# Patient Record
Sex: FEMALE | Race: WHITE | Employment: FULL TIME | URBAN - NONMETROPOLITAN AREA
[De-identification: names, ages, dates, MRNs, and addresses within clinical notes are randomized per-mention and may not be internally consistent; named-entity substitution may affect disease eponyms.]

---

## 2017-05-09 ENCOUNTER — HOSPITAL ENCOUNTER (EMERGENCY)
Age: 42
Discharge: HOME | End: 2017-05-09
Attending: FAMILY MEDICINE
Payer: MEDICAID

## 2017-05-09 VITALS
TEMPERATURE: 98.6 F | SYSTOLIC BLOOD PRESSURE: 132 MMHG | RESPIRATION RATE: 16 BRPM | OXYGEN SATURATION: 98 % | HEART RATE: 80 BPM | DIASTOLIC BLOOD PRESSURE: 78 MMHG

## 2017-05-09 DIAGNOSIS — S01.81XA FACIAL LACERATION, INITIAL ENCOUNTER: Primary | ICD-10-CM

## 2017-05-09 PROCEDURE — 12011 RPR F/E/E/N/L/M 2.5 CM/<: CPT

## 2017-05-09 PROCEDURE — 99282 EMERGENCY DEPT VISIT SF MDM: CPT

## 2017-05-09 RX ORDER — LIDOCAINE HYDROCHLORIDE 10 MG/ML
INJECTION, SOLUTION INFILTRATION; PERINEURAL
Status: DISCONTINUED
Start: 2017-05-09 | End: 2017-05-09 | Stop reason: HOSPADM

## 2017-05-09 RX ORDER — BACLOFEN 10 MG/1
10 TABLET ORAL SEE ADMIN INSTRUCTIONS
COMMUNITY
End: 2017-05-09

## 2017-05-09 RX ORDER — LIDOCAINE HYDROCHLORIDE 10 MG/ML
5 INJECTION, SOLUTION EPIDURAL; INFILTRATION; INTRACAUDAL; PERINEURAL ONCE
Status: DISCONTINUED | OUTPATIENT
Start: 2017-05-09 | End: 2017-05-09 | Stop reason: HOSPADM

## 2017-05-09 RX ORDER — METHOCARBAMOL 500 MG/1
500 TABLET, FILM COATED ORAL 3 TIMES DAILY
Qty: 30 TABLET | Refills: 0 | Status: SHIPPED | OUTPATIENT
Start: 2017-05-09 | End: 2017-05-19

## 2017-05-09 RX ORDER — PREDNISONE 50 MG/1
50 TABLET ORAL DAILY
COMMUNITY
End: 2017-07-28 | Stop reason: ALTCHOICE

## 2017-05-09 ASSESSMENT — PAIN DESCRIPTION - PAIN TYPE: TYPE: ACUTE PAIN

## 2017-05-09 ASSESSMENT — PAIN DESCRIPTION - DESCRIPTORS: DESCRIPTORS: CONSTANT

## 2017-05-09 ASSESSMENT — PAIN SCALES - GENERAL: PAINLEVEL_OUTOF10: 3

## 2017-07-28 ENCOUNTER — HOSPITAL ENCOUNTER (EMERGENCY)
Age: 42
Discharge: HOME | End: 2017-07-28
Attending: FAMILY MEDICINE
Payer: MEDICAID

## 2017-07-28 ENCOUNTER — APPOINTMENT (OUTPATIENT)
Dept: GENERAL RADIOLOGY | Age: 42
End: 2017-07-28
Payer: MEDICAID

## 2017-07-28 VITALS
TEMPERATURE: 98.6 F | RESPIRATION RATE: 14 BRPM | DIASTOLIC BLOOD PRESSURE: 78 MMHG | HEART RATE: 80 BPM | SYSTOLIC BLOOD PRESSURE: 113 MMHG | OXYGEN SATURATION: 100 %

## 2017-07-28 DIAGNOSIS — M77.01 MEDIAL EPICONDYLITIS OF ELBOW, RIGHT: Primary | ICD-10-CM

## 2017-07-28 PROCEDURE — 99283 EMERGENCY DEPT VISIT LOW MDM: CPT

## 2017-07-28 PROCEDURE — 73080 X-RAY EXAM OF ELBOW: CPT

## 2017-07-28 RX ORDER — NAPROXEN 500 MG/1
500 TABLET ORAL 2 TIMES DAILY
Qty: 60 TABLET | Refills: 0 | Status: SHIPPED | OUTPATIENT
Start: 2017-07-28 | End: 2017-11-23 | Stop reason: ALTCHOICE

## 2017-07-28 ASSESSMENT — PAIN DESCRIPTION - PAIN TYPE: TYPE: ACUTE PAIN

## 2017-07-28 ASSESSMENT — PAIN DESCRIPTION - LOCATION: LOCATION: ELBOW

## 2017-07-28 ASSESSMENT — PAIN DESCRIPTION - DESCRIPTORS: DESCRIPTORS: CONSTANT

## 2017-07-28 ASSESSMENT — PAIN DESCRIPTION - ORIENTATION: ORIENTATION: RIGHT

## 2017-07-28 ASSESSMENT — PAIN SCALES - GENERAL
PAINLEVEL_OUTOF10: 5
PAINLEVEL_OUTOF10: 4

## 2017-07-28 ASSESSMENT — PAIN DESCRIPTION - FREQUENCY: FREQUENCY: CONTINUOUS

## 2017-07-28 ASSESSMENT — PAIN DESCRIPTION - PROGRESSION: CLINICAL_PROGRESSION: GRADUALLY WORSENING

## 2017-09-13 ENCOUNTER — HOSPITAL ENCOUNTER (EMERGENCY)
Age: 42
Discharge: HOME | End: 2017-09-13
Attending: EMERGENCY MEDICINE
Payer: MEDICAID

## 2017-09-13 VITALS
RESPIRATION RATE: 16 BRPM | TEMPERATURE: 98.1 F | SYSTOLIC BLOOD PRESSURE: 144 MMHG | DIASTOLIC BLOOD PRESSURE: 89 MMHG | HEART RATE: 81 BPM | OXYGEN SATURATION: 99 %

## 2017-09-13 DIAGNOSIS — M54.30 ACUTE SCIATICA: Primary | ICD-10-CM

## 2017-09-13 PROCEDURE — 99282 EMERGENCY DEPT VISIT SF MDM: CPT

## 2017-09-13 PROCEDURE — 96372 THER/PROPH/DIAG INJ SC/IM: CPT

## 2017-09-13 PROCEDURE — 6370000000 HC RX 637 (ALT 250 FOR IP): Performed by: EMERGENCY MEDICINE

## 2017-09-13 PROCEDURE — 6360000002 HC RX W HCPCS: Performed by: EMERGENCY MEDICINE

## 2017-09-13 RX ORDER — LORAZEPAM 0.5 MG/1
1 TABLET ORAL ONCE
Status: COMPLETED | OUTPATIENT
Start: 2017-09-13 | End: 2017-09-13

## 2017-09-13 RX ORDER — BACLOFEN 10 MG/1
10 TABLET ORAL 3 TIMES DAILY PRN
COMMUNITY
End: 2018-08-03 | Stop reason: SDUPTHER

## 2017-09-13 RX ORDER — ONDANSETRON 4 MG/1
4 TABLET, ORALLY DISINTEGRATING ORAL ONCE
Status: COMPLETED | OUTPATIENT
Start: 2017-09-13 | End: 2017-09-13

## 2017-09-13 RX ADMIN — ONDANSETRON 4 MG: 4 TABLET, ORALLY DISINTEGRATING ORAL at 20:38

## 2017-09-13 RX ADMIN — LORAZEPAM 1 MG: 0.5 TABLET ORAL at 21:07

## 2017-09-13 RX ADMIN — HYDROMORPHONE HYDROCHLORIDE 1 MG: 1 INJECTION, SOLUTION INTRAMUSCULAR; INTRAVENOUS; SUBCUTANEOUS at 20:38

## 2017-09-13 ASSESSMENT — ENCOUNTER SYMPTOMS
GASTROINTESTINAL NEGATIVE: 1
BACK PAIN: 1
RESPIRATORY NEGATIVE: 1
ALLERGIC/IMMUNOLOGIC NEGATIVE: 1
ABDOMINAL PAIN: 0
BOWEL INCONTINENCE: 0
ABDOMINAL SWELLING: 0
EYES NEGATIVE: 1

## 2017-09-13 ASSESSMENT — PAIN SCALES - GENERAL
PAINLEVEL_OUTOF10: 9
PAINLEVEL_OUTOF10: 9

## 2017-09-13 ASSESSMENT — PAIN DESCRIPTION - ORIENTATION: ORIENTATION: RIGHT

## 2017-09-13 ASSESSMENT — PAIN DESCRIPTION - DESCRIPTORS: DESCRIPTORS: SHOOTING

## 2017-09-13 ASSESSMENT — PAIN DESCRIPTION - FREQUENCY: FREQUENCY: CONTINUOUS

## 2017-09-13 ASSESSMENT — PAIN DESCRIPTION - PROGRESSION: CLINICAL_PROGRESSION: NOT CHANGED

## 2017-09-13 ASSESSMENT — PAIN DESCRIPTION - LOCATION: LOCATION: BACK

## 2017-09-13 ASSESSMENT — PAIN DESCRIPTION - PAIN TYPE: TYPE: ACUTE PAIN

## 2017-09-13 ASSESSMENT — PAIN DESCRIPTION - ONSET: ONSET: SUDDEN

## 2017-11-23 ENCOUNTER — HOSPITAL ENCOUNTER (EMERGENCY)
Age: 42
Discharge: HOME | End: 2017-11-23
Attending: FAMILY MEDICINE
Payer: COMMERCIAL

## 2017-11-23 ENCOUNTER — APPOINTMENT (OUTPATIENT)
Dept: GENERAL RADIOLOGY | Age: 42
End: 2017-11-23
Payer: COMMERCIAL

## 2017-11-23 VITALS
SYSTOLIC BLOOD PRESSURE: 100 MMHG | RESPIRATION RATE: 16 BRPM | OXYGEN SATURATION: 100 % | DIASTOLIC BLOOD PRESSURE: 76 MMHG | TEMPERATURE: 98.7 F | HEART RATE: 86 BPM
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
## 2017-11-23 DIAGNOSIS — M54.32 SCIATICA OF LEFT SIDE: ICD-10-CM

## 2017-11-23 DIAGNOSIS — S70.02XA CONTUSION OF LEFT HIP, INITIAL ENCOUNTER: ICD-10-CM

## 2017-11-23 PROCEDURE — 99284 EMERGENCY DEPT VISIT MOD MDM: CPT

## 2017-11-23 PROCEDURE — 73502 X-RAY EXAM HIP UNI 2-3 VIEWS: CPT

## 2017-11-23 PROCEDURE — 96372 THER/PROPH/DIAG INJ SC/IM: CPT

## 2017-11-23 PROCEDURE — 6360000002 HC RX W HCPCS: Performed by: FAMILY MEDICINE

## 2017-11-23 RX ORDER — KETOROLAC TROMETHAMINE 30 MG/ML
30 INJECTION, SOLUTION INTRAMUSCULAR; INTRAVENOUS ONCE
Status: COMPLETED | OUTPATIENT
Start: 2017-11-23 | End: 2017-11-23

## 2017-11-23 RX ORDER — NAPROXEN 500 MG/1
500 TABLET ORAL 2 TIMES DAILY
Qty: 60 TABLET | Refills: 0 | Status: SHIPPED | OUTPATIENT
Start: 2017-11-23 | End: 2018-07-12 | Stop reason: ALTCHOICE

## 2017-11-23 RX ORDER — TRAMADOL HYDROCHLORIDE 50 MG/1
50 TABLET ORAL 3 TIMES DAILY PRN
COMMUNITY
End: 2018-08-03 | Stop reason: SDUPTHER

## 2017-11-23 RX ADMIN — KETOROLAC TROMETHAMINE 30 MG: 30 INJECTION, SOLUTION INTRAMUSCULAR at 17:49

## 2017-11-23 ASSESSMENT — PAIN DESCRIPTION - LOCATION: LOCATION: HEAD;HIP

## 2017-11-23 ASSESSMENT — PAIN SCALES - GENERAL
PAINLEVEL_OUTOF10: 6
PAINLEVEL_OUTOF10: 7
PAINLEVEL_OUTOF10: 8

## 2017-11-23 ASSESSMENT — PAIN DESCRIPTION - DESCRIPTORS: DESCRIPTORS: ACHING

## 2017-11-23 ASSESSMENT — PAIN DESCRIPTION - PAIN TYPE: TYPE: ACUTE PAIN

## 2017-11-23 ASSESSMENT — PAIN DESCRIPTION - ORIENTATION: ORIENTATION: RIGHT

## 2017-11-23 NOTE — ED NOTES
Pt works for [employer], not located in drug screen file. Pt asked and states was not told she needed a drug screen.       Sara Adams RN  11/23/17 [id number]

## 2017-11-23 NOTE — ED PROVIDER NOTES
photophobia or discharge   HENT:  Denies sore throat or ear pain   Respiratory:  Denies cough or shortness of breath   Cardiovascular:  Denies chest pain, palpitations or swelling   GI:  Denies abdominal pain, nausea, vomiting, or diarrhea   Musculoskeletal:  Denies back pain   Skin:  Denies rash   Neurologic:  Denies headache, focal weakness or sensory changes   Endocrine:  Denies polyuria or polydypsia   Lymphatic:  Denies swollen glands   Psychiatric:  Denies depression, suicidal ideation or homicidal ideation   All systems negative except as marked. PHYSICAL EXAM    VITAL SIGNS: /76   Pulse 86   Temp 98.7 °F (37.1 °C) (Oral)   Resp 16   SpO2 100%    Constitutional:  Well developed, Well nourished, No acute distress, Non-toxic appearance. HENT:  Normocephalic, Atraumatic, Bilateral external ears normal, TM's normal, Oropharynx moist, No oral exudates, Nose normal. Neck- Normal range of motion, No tenderness, Supple, No stridor. Eyes:  PERRL, EOMI, Conjunctiva normal, No discharge. Respiratory:  Normal breath sounds, No respiratory distress, No wheezing, No chest tenderness. Cardiovascular:  Normal heart rate, Normal rhythm, No murmurs, No rubs, No gallops appreciated. Che Prows GI:  Bowel sounds normal, Soft, No tenderness, No masses or hepatosplenomegaly, No pulsatile masses. : No CVA tenderness. Musculoskeletal:   No edema,tenderness to palpation left hip and inferior pubic ramus, No cyanosis, No clubbing. Good range of motion in all major joints. No  major deformities noted. Back- No tenderness to percussion of spine. Integument:  Warm, Dry, No erythema, No rash. Lymphatic:  No lymphadenopathy noted. Neurologic:  Alert & oriented x 3, Normal motor function, Normal sensory function, No focal deficits noted. Cranial nerves 2-12 wnl and symmetrical.   Psychiatric:  Affect normal, Judgment normal, Mood normal.     EKG        RADIOLOGY    No results found.       PROCEDURES      Labs  Labs

## 2018-07-12 ENCOUNTER — HOSPITAL ENCOUNTER (EMERGENCY)
Age: 43
Discharge: HOME | End: 2018-07-12
Attending: EMERGENCY MEDICINE
Payer: MEDICAID

## 2018-07-12 VITALS
DIASTOLIC BLOOD PRESSURE: 77 MMHG | TEMPERATURE: 98.7 F | OXYGEN SATURATION: 99 % | HEART RATE: 68 BPM | BODY MASS INDEX: 19.27 KG/M2 | SYSTOLIC BLOOD PRESSURE: 122 MMHG | HEIGHT: 67 IN | WEIGHT: 122.8 LBS | RESPIRATION RATE: 20 BRPM

## 2018-07-12 DIAGNOSIS — T63.444A BEE STING, UNDETERMINED INTENT, INITIAL ENCOUNTER: Primary | ICD-10-CM

## 2018-07-12 PROCEDURE — 96374 THER/PROPH/DIAG INJ IV PUSH: CPT

## 2018-07-12 PROCEDURE — 96375 TX/PRO/DX INJ NEW DRUG ADDON: CPT

## 2018-07-12 PROCEDURE — 99282 EMERGENCY DEPT VISIT SF MDM: CPT

## 2018-07-12 PROCEDURE — 6360000002 HC RX W HCPCS: Performed by: EMERGENCY MEDICINE

## 2018-07-12 RX ORDER — METHYLPREDNISOLONE SODIUM SUCCINATE 40 MG/ML
40 INJECTION, POWDER, LYOPHILIZED, FOR SOLUTION INTRAMUSCULAR; INTRAVENOUS ONCE
Status: COMPLETED | OUTPATIENT
Start: 2018-07-12 | End: 2018-07-12

## 2018-07-12 RX ORDER — EPINEPHRINE 0.3 MG/.3ML
0.3 INJECTION SUBCUTANEOUS
Qty: 1 EACH | Refills: 0 | Status: SHIPPED | OUTPATIENT
Start: 2018-07-12 | End: 2018-07-12

## 2018-07-12 RX ORDER — DIPHENHYDRAMINE HYDROCHLORIDE 50 MG/ML
25 INJECTION INTRAMUSCULAR; INTRAVENOUS ONCE
Status: COMPLETED | OUTPATIENT
Start: 2018-07-12 | End: 2018-07-12

## 2018-07-12 RX ORDER — ETODOLAC 500 MG/1
500 TABLET, FILM COATED ORAL 2 TIMES DAILY
COMMUNITY
Start: 2018-05-07 | End: 2018-08-03 | Stop reason: SDUPTHER

## 2018-07-12 RX ADMIN — METHYLPREDNISOLONE SODIUM SUCCINATE 40 MG: 40 INJECTION, POWDER, FOR SOLUTION INTRAMUSCULAR; INTRAVENOUS at 11:19

## 2018-07-12 RX ADMIN — DIPHENHYDRAMINE HYDROCHLORIDE 25 MG: 50 INJECTION, SOLUTION INTRAMUSCULAR; INTRAVENOUS at 11:18

## 2018-07-12 NOTE — ED PROVIDER NOTES
eMERGENCY dEPARTMENT eNCOUnter        [facility]    Chief Complaint   Patient presents with    Insect Bite     stung to left ankle by bee at 0800, started having itching to bilateral hands and right leg        [facility]    Mimi Royal is a 41 y.o. female who presents to ED from Home. By car. With complaint of bee sting to the left ankle at 8 AM  Onset 8 PM this morning  Intensity of symptoms patient presents with itching. Denies shortness of breath denies hives  Location of symptoms left ankle with mild swelling post bee sting. REVIEW OF SYSTEMS    All systems reviewed and positives are in the HPI      PAST MEDICAL HISTORY    Past Medical History:   Diagnosis Date    Back pain 2003    Degenerative disc disease at L5-S1 level     bulged discs    Fibromyalgia        SURGICAL HISTORY    Past Surgical History:   Procedure Laterality Date    HYSTERECTOMY      TONSILLECTOMY         CURRENT MEDICATIONS    Current Outpatient Rx   Medication Sig Dispense Refill    etodolac (LODINE) 500 MG tablet Take 500 mg by mouth 2 times daily      traMADol (ULTRAM) 50 MG tablet Take 50 mg by mouth 3 times daily as needed for Pain .  baclofen (LIORESAL) 10 MG tablet Take 10 mg by mouth 3 times daily as needed      gabapentin (NEURONTIN) 800 MG tablet Take 800 mg by mouth 3 times daily         ALLERGIES    Allergies   Allergen Reactions    Flexeril [Cyclobenzaprine] Shortness Of Breath       FAMILY HISTORY    History reviewed. No pertinent family history.     SOCIAL HISTORY    Social History     Social History    Marital status: Single     Spouse name: N/A    Number of children: N/A    Years of education: N/A     Social History Main Topics    Smoking status: Current Every Day Smoker     Packs/day: 1.00     Types: Cigarettes    Smokeless tobacco: Never Used    Alcohol use No    Drug use: No    Sexual activity: Not Asked     Other Topics Concern    None     Social History Narrative    None       PHYSICAL EXAM    VITAL SIGNS: /77   Pulse 68   Temp 98.7 °F (37.1 °C) (Oral)   Resp 20   Ht 5' 7\" (1.702 m)   Wt 122 lb 12.8 oz (55.7 kg)   SpO2 99%   BMI 19.23 kg/m²   Constitutional:  Well developed, well nourished, no acute distress, non-toxic appearance   HENT:  Atraumatic, external ears normal, nose normal, oropharynx moist, no pharyngeal exudates. Neck- supple   Respiratory:  No respiratory distress, normal breath sounds   Cardiovascular:  Normal rate, normal rhythm, no murmurs, no gallops, no rubs   GI:  Soft, nondistended, normal bowel sounds, nontender, no organomegaly   Musculoskeletal:  No edema, no tenderness, no deformities. Integument: Left ankle with mild swelling and mild redness no hives. RADIOLOGY/PROCEDURES    No orders to display         Labs  Labs Reviewed - No data to display        Summation      Patient Course:Patient also has systemic produced. No other systemic symptoms. Patient is sent home with a prescription for EpiPen   Patient feels better prior to discharge. Continue Benadryl as needed. Return to ED if worse for his symptoms. The warning signs were discussed. ED Medications administered this visit:    Medications   diphenhydrAMINE (BENADRYL) injection 25 mg (not administered)   methylPREDNISolone sodium (SOLU-MEDROL) injection 40 mg (not administered)       New Prescriptions from this visit:    New Prescriptions    No medications on file       Follow-up:  No follow-up provider specified. Final Impression:   1.  Bee sting, undetermined intent, initial encounter               (Please note that portions of this note were completed with a voice recognition program.  Efforts were made to edit the dictations but occasionally words are mis-transcribed.)          Talya Henning MD  07/12/18 [id number]

## 2018-07-30 PROBLEM — M19.90 OA (OSTEOARTHRITIS): Status: ACTIVE | Noted: 2018-07-30

## 2018-07-30 PROBLEM — E05.90 HYPERTHYROIDISM: Status: ACTIVE | Noted: 2018-07-30

## 2018-08-02 PROBLEM — F17.200 SMOKER: Status: ACTIVE | Noted: 2018-08-02

## 2018-08-03 ENCOUNTER — OFFICE VISIT (OUTPATIENT)
Dept: FAMILY MEDICINE CLINIC | Age: 43
End: 2018-08-03
Payer: MEDICAID

## 2018-08-03 VITALS
WEIGHT: 123 LBS | BODY MASS INDEX: 19.3 KG/M2 | SYSTOLIC BLOOD PRESSURE: 110 MMHG | DIASTOLIC BLOOD PRESSURE: 72 MMHG | RESPIRATION RATE: 18 BRPM | HEART RATE: 72 BPM | HEIGHT: 67 IN

## 2018-08-03 DIAGNOSIS — F17.200 TOBACCO DEPENDENCE: ICD-10-CM

## 2018-08-03 DIAGNOSIS — M47.816 OSTEOARTHRITIS OF LUMBAR SPINE, UNSPECIFIED SPINAL OSTEOARTHRITIS COMPLICATION STATUS: ICD-10-CM

## 2018-08-03 DIAGNOSIS — Z13.220 LIPID SCREENING: ICD-10-CM

## 2018-08-03 DIAGNOSIS — E05.90 HYPERTHYROIDISM: ICD-10-CM

## 2018-08-03 DIAGNOSIS — M51.36 LUMBAR DEGENERATIVE DISC DISEASE: Primary | ICD-10-CM

## 2018-08-03 DIAGNOSIS — Z23 NEED FOR STREPTOCOCCUS PNEUMONIAE VACCINATION: ICD-10-CM

## 2018-08-03 DIAGNOSIS — M79.7 FIBROMYALGIA: ICD-10-CM

## 2018-08-03 PROCEDURE — 90732 PPSV23 VACC 2 YRS+ SUBQ/IM: CPT | Performed by: INTERNAL MEDICINE

## 2018-08-03 PROCEDURE — G8420 CALC BMI NORM PARAMETERS: HCPCS | Performed by: INTERNAL MEDICINE

## 2018-08-03 PROCEDURE — G8427 DOCREV CUR MEDS BY ELIG CLIN: HCPCS | Performed by: INTERNAL MEDICINE

## 2018-08-03 PROCEDURE — 99204 OFFICE O/P NEW MOD 45 MIN: CPT | Performed by: INTERNAL MEDICINE

## 2018-08-03 PROCEDURE — 90471 IMMUNIZATION ADMIN: CPT | Performed by: INTERNAL MEDICINE

## 2018-08-03 PROCEDURE — 4004F PT TOBACCO SCREEN RCVD TLK: CPT | Performed by: INTERNAL MEDICINE

## 2018-08-03 RX ORDER — GABAPENTIN 800 MG/1
800 TABLET ORAL 3 TIMES DAILY
Qty: 120 TABLET | Refills: 2 | Status: SHIPPED | OUTPATIENT
Start: 2018-08-03 | End: 2018-08-14 | Stop reason: SDUPTHER

## 2018-08-03 RX ORDER — BACLOFEN 10 MG/1
10 TABLET ORAL 3 TIMES DAILY PRN
Qty: 90 TABLET | Refills: 2 | Status: SHIPPED | OUTPATIENT
Start: 2018-08-03 | End: 2018-09-02

## 2018-08-03 RX ORDER — VARENICLINE TARTRATE 25 MG
KIT ORAL
Qty: 1 EACH | Refills: 0 | Status: SHIPPED | OUTPATIENT
Start: 2018-08-03 | End: 2018-11-25 | Stop reason: ALTCHOICE

## 2018-08-03 RX ORDER — ETODOLAC 500 MG/1
500 TABLET, FILM COATED ORAL 2 TIMES DAILY
Qty: 60 TABLET | Refills: 3 | Status: SHIPPED | OUTPATIENT
Start: 2018-08-03 | End: 2018-11-20 | Stop reason: SDUPTHER

## 2018-08-03 RX ORDER — TRAMADOL HYDROCHLORIDE 50 MG/1
100 TABLET ORAL 3 TIMES DAILY PRN
Qty: 180 TABLET | Refills: 0 | Status: SHIPPED | OUTPATIENT
Start: 2018-08-03 | End: 2018-08-30 | Stop reason: SDUPTHER

## 2018-08-03 ASSESSMENT — ENCOUNTER SYMPTOMS
COUGH: 0
NAUSEA: 0
ABDOMINAL PAIN: 0
SORE THROAT: 0
SHORTNESS OF BREATH: 0
HEARTBURN: 0
VOMITING: 0
BACK PAIN: 1
DIARRHEA: 0
BLURRED VISION: 0

## 2018-08-03 ASSESSMENT — PATIENT HEALTH QUESTIONNAIRE - PHQ9
2. FEELING DOWN, DEPRESSED OR HOPELESS: 0
SUM OF ALL RESPONSES TO PHQ QUESTIONS 1-9: 0
1. LITTLE INTEREST OR PLEASURE IN DOING THINGS: 0
SUM OF ALL RESPONSES TO PHQ9 QUESTIONS 1 & 2: 0

## 2018-08-03 NOTE — PROGRESS NOTES
HPI Notes    Name: Barbara Magana  : 1975         Chief Complaint:     Chief Complaint   Patient presents with    New Patient     get established    Back Pain     lumbar disc disease       History of Present Illness:        Ford Neil is a new patient. She presents to our office to follow up for fibromyalgia, chronic back pain due to spinal stenosis, tobacco dependence and also history of thyroid problem in the past.    She has a h/o Fibromyalgia, on Gabapentin, helps a lot. she would like to get that he feels    Has a h/o spinal stenosis. Says was seeing Dr. Jeri Boyd in the past, says spinal fusion was offerred, but no good results were promised and she decided not to have surgery. Takes tramadol for pain control and it controls the pain reasonably so that she can function. She works as a [employer]. She used to follow up with pain doctors and unfortunately they moved out one after another. She would like a referral to a pain specialist for tramadol prescription. She reports smoking most of her life, she and would like to try Chantix for tobacco cessation. We discussed potential side effects from Chantix and she is advised to quit taking if she develops any side effects. The patient also states that she has a history of some sort of thyroid problem. Used to be on some medications, does not recall the name          Back Pain   This is a chronic problem. The current episode started more than 1 year ago. The problem occurs constantly. The problem is unchanged. The pain is present in the lumbar spine. The pain is at a severity of 7/10. The pain is worse during the day. The symptoms are aggravated by standing and twisting. Pertinent negatives include no abdominal pain, bladder incontinence, chest pain, dysuria, fever, headaches or weight loss. She has tried NSAIDs and muscle relaxant (Tramadol) for the symptoms. The treatment provided moderate relief.            Past Medical History:     Past Medical History:   Diagnosis Date    Back pain 2003    Degenerative disc disease at L5-S1 level     bulged discs    Fibromyalgia       Reviewed all health maintenance requirements and ordered appropriate tests  Health Maintenance Due   Topic Date Due    TSH testing  1975    HIV screen  03/20/1992    DTaP/Tdap/Td vaccine (1 - Tdap) 03/20/1996    Cervical cancer screen  03/20/1998    Lipid screen  03/20/2017       Past Surgical History:     Past Surgical History:   Procedure Laterality Date    HYSTERECTOMY      TONSILLECTOMY          Medications:       Prior to Admission medications    Medication Sig Start Date End Date Taking? Authorizing Provider   etodolac (LODINE) 500 MG tablet Take 1 tablet by mouth 2 times daily 8/3/18  Yes Gudelia Lyon MD   traMADol (ULTRAM) 50 MG tablet Take 2 tablets by mouth 3 times daily as needed for Pain for up to 30 days. . 8/3/18 9/2/18 Yes Gudelia Lyon MD   baclofen (LIORESAL) 10 MG tablet Take 1 tablet by mouth 3 times daily as needed (back spasm) 8/3/18 9/2/18 Yes Gudelia Lyon MD   gabapentin (NEURONTIN) 800 MG tablet Take 1 tablet by mouth 3 times daily for 30 days. . 8/3/18 9/2/18 Yes Gudelia Lyon MD   varenicline (CHANTIX STARTING MONTH PAK) 0.5 MG X 11 & 1 MG X 42 tablet Take by mouth. 8/3/18  Yes Gudelia Lyon MD   EPINEPHrine (EPIPEN 2-ROBERTO) 0.3 MG/0.3ML SOAJ injection Inject 0.3 mLs into the muscle once as needed (For anaphylactic symptoms) 7/12/18 7/12/18  Annmarie Bauer MD        Allergies:       Bee venom and Flexeril [cyclobenzaprine]    Social History:     Tobacco:    reports that she has been smoking Cigarettes. She has a 10.50 pack-year smoking history. She has never used smokeless tobacco.  Alcohol:      reports that she does not drink alcohol. Drug Use:  reports that she does not use drugs. Family History:     No family history on file.     Review of Systems:         Review of Systems   Constitutional: Negative for chills, fever, malaise/fatigue and weight loss. HENT: Negative for congestion and sore throat. Eyes: Negative for blurred vision. Respiratory: Negative for cough and shortness of breath. Cardiovascular: Negative for chest pain and palpitations. Gastrointestinal: Negative for abdominal pain, diarrhea, heartburn, nausea and vomiting. Genitourinary: Negative for bladder incontinence and dysuria. Musculoskeletal: Positive for back pain and myalgias (Due to fibromyalgia). Skin: Negative for rash. Neurological: Negative for dizziness and headaches. Psychiatric/Behavioral: Negative for depression and suicidal ideas. The patient is not nervous/anxious and does not have insomnia. Physical Exam:     Vitals:  /72 (Site: Left Arm, Position: Sitting, Cuff Size: Medium Adult)   Pulse 72   Resp 18   Ht 5' 7\" (1.702 m)   Wt 123 lb (55.8 kg)   BMI 19.26 kg/m²       Physical Exam   Constitutional: She is oriented to person, place, and time. She appears well-developed and well-nourished. No distress. HENT:   Head: Normocephalic and atraumatic. Right Ear: External ear normal.   Left Ear: External ear normal.   Mouth/Throat: Oropharynx is clear and moist. No oropharyngeal exudate. Eyes: Conjunctivae are normal. Pupils are equal, round, and reactive to light. Right eye exhibits no discharge. Left eye exhibits no discharge. Neck: No thyromegaly present. Cardiovascular: Normal rate, regular rhythm and normal heart sounds. No murmur heard. Pulmonary/Chest: Effort normal and breath sounds normal. She has no wheezes. She has no rales. Abdominal: Soft. Bowel sounds are normal. She exhibits no distension and no mass. There is no tenderness. Musculoskeletal: Normal range of motion. She exhibits no edema, tenderness or deformity. Lymphadenopathy:     She has no cervical adenopathy. Neurological: She is alert and oriented to person, place, and time. No cranial nerve deficit.    Skin: Skin is warm and tablet 90 tablet 2     Sig: Take 1 tablet by mouth 3 times daily as needed (back spasm)    gabapentin (NEURONTIN) 800 MG tablet 120 tablet 2     Sig: Take 1 tablet by mouth 3 times daily for 30 days. Quirino Estradaer varenicline (CHANTIX STARTING MONTH ROBERTO) 0.5 MG X 11 & 1 MG X 42 tablet 1 each 0     Sig: Take by mouth. Return in about 4 weeks (around 8/31/2018) for chantix, tobacco dependence. Orders Placed This Encounter   Medications    etodolac (LODINE) 500 MG tablet     Sig: Take 1 tablet by mouth 2 times daily     Dispense:  60 tablet     Refill:  3    traMADol (ULTRAM) 50 MG tablet     Sig: Take 2 tablets by mouth 3 times daily as needed for Pain for up to 30 days. .     Dispense:  180 tablet     Refill:  0    baclofen (LIORESAL) 10 MG tablet     Sig: Take 1 tablet by mouth 3 times daily as needed (back spasm)     Dispense:  90 tablet     Refill:  2    gabapentin (NEURONTIN) 800 MG tablet     Sig: Take 1 tablet by mouth 3 times daily for 30 days. .     Dispense:  120 tablet     Refill:  2    varenicline (CHANTIX STARTING MONTH PAK) 0.5 MG X 11 & 1 MG X 42 tablet     Sig: Take by mouth. Dispense:  1 each     Refill:  0     Orders Placed This Encounter   Procedures    Pneumococcal polysaccharide vaccine 23-valent greater than or equal to 3yo subcutaneous/IM    TSH with Reflex     Standing Status:   Future     Standing Expiration Date:   8/3/2019    Lipid Panel     Standing Status:   Future     Standing Expiration Date:   8/3/2019     Order Specific Question:   Is Patient Fasting?/# of Hours     Answer:   8 hrs    External Referral To Pain Clinic     Referral Priority:   Routine     Referral Type:   Consult for Advice and Opinion     Referral Reason:   Specialty Services Required     Referred to Provider:   Bruna Fisher MD     Requested Specialty:   Pain Management     Number of Visits Requested:   1         There are no Patient Instructions on file for this visit.     Electronically signed by Lolis Calixto

## 2018-08-06 DIAGNOSIS — M51.36 LUMBAR DEGENERATIVE DISC DISEASE: Primary | ICD-10-CM

## 2018-08-06 DIAGNOSIS — M79.7 FIBROMYALGIA: ICD-10-CM

## 2018-08-14 ENCOUNTER — TELEPHONE (OUTPATIENT)
Dept: FAMILY MEDICINE CLINIC | Age: 43
End: 2018-08-14

## 2018-08-14 RX ORDER — GABAPENTIN 800 MG/1
800 TABLET ORAL 4 TIMES DAILY
Qty: 120 TABLET | Refills: 2 | Status: SHIPPED | OUTPATIENT
Start: 2018-08-14 | End: 2018-09-13

## 2018-08-24 ENCOUNTER — HOSPITAL ENCOUNTER (OUTPATIENT)
Age: 43
Discharge: HOME | End: 2018-08-24
Payer: MEDICAID

## 2018-08-24 ENCOUNTER — TELEPHONE (OUTPATIENT)
Dept: FAMILY MEDICINE CLINIC | Age: 43
End: 2018-08-24

## 2018-08-24 DIAGNOSIS — M51.36 LUMBAR DEGENERATIVE DISC DISEASE: Primary | ICD-10-CM

## 2018-08-24 DIAGNOSIS — Z13.220 LIPID SCREENING: ICD-10-CM

## 2018-08-24 DIAGNOSIS — M54.5 CHRONIC LOW BACK PAIN, UNSPECIFIED BACK PAIN LATERALITY, WITH SCIATICA PRESENCE UNSPECIFIED: ICD-10-CM

## 2018-08-24 DIAGNOSIS — G89.29 CHRONIC LOW BACK PAIN, UNSPECIFIED BACK PAIN LATERALITY, WITH SCIATICA PRESENCE UNSPECIFIED: ICD-10-CM

## 2018-08-24 DIAGNOSIS — E05.90 HYPERTHYROIDISM: ICD-10-CM

## 2018-08-24 PROBLEM — M54.50 CHRONIC LOW BACK PAIN: Status: ACTIVE | Noted: 2018-08-24

## 2018-08-24 LAB
CHOLESTEROL/HDL RATIO: 3.2
CHOLESTEROL: 169 MG/DL
HDLC SERPL-MCNC: 53 MG/DL
LDL CHOLESTEROL: 101 MG/DL (ref 0–130)
PATIENT FASTING?: YES
THYROXINE, FREE: 1.02 NG/DL (ref 0.93–1.7)
TRIGL SERPL-MCNC: 77 MG/DL
TSH SERPL DL<=0.05 MIU/L-ACNC: 0.21 MIU/L (ref 0.3–5)
VLDLC SERPL CALC-MCNC: NORMAL MG/DL (ref 1–30)

## 2018-08-24 PROCEDURE — 80061 LIPID PANEL: CPT

## 2018-08-24 PROCEDURE — 84443 ASSAY THYROID STIM HORMONE: CPT

## 2018-08-24 PROCEDURE — 84439 ASSAY OF FREE THYROXINE: CPT

## 2018-08-24 PROCEDURE — 36415 COLL VENOUS BLD VENIPUNCTURE: CPT

## 2018-08-24 NOTE — TELEPHONE ENCOUNTER
Pt was [facility] to [facility]. Pt was a pt of their affiliate clinic and can't be scheduled with them. The doctor she was seeing has left that practice and would like to be referred to [facility] now.      Health Maintenance   Topic Date Due    TSH testing  1975    HIV screen  03/20/1992    DTaP/Tdap/Td vaccine (1 - Tdap) 03/20/1996    Cervical cancer screen  03/20/1998    Lipid screen  03/20/2017    Flu vaccine (1) 09/01/2018    Pneumococcal med risk  Completed             (applicable per patient's age: Cancer Screenings, Depression Screening, Fall Risk Screening, Immunizations)    No results found for: LABA1C, LABMICR, LDLCHOLESTEROL, LDLCALC, AST, ALT, BUN   (goal A1C is < 7)   (goal LDL is <100) need 30-50% reduction from baseline     BP Readings from Last 3 Encounters:   08/03/18 110/72   07/12/18 122/77   11/23/17 100/76    (goal /80)      All Future Testing planned in [facility]:  Lab Frequency Next Occurrence   TSH with Reflex Once 03/13/2019   Lipid Panel Once 03/13/2019       Next Visit Date:  Future Appointments  Date Time Provider Pacheco Yeung   8/30/2018 2:15 PM Mary Ulrich, Marifer Treadwell            Patient Active Problem List:     Hyperthyroidism     Lumbar degenerative disc disease     OA (osteoarthritis)     Smoker     Fibromyalgia

## 2018-08-30 ENCOUNTER — OFFICE VISIT (OUTPATIENT)
Dept: FAMILY MEDICINE CLINIC | Age: 43
End: 2018-08-30
Payer: MEDICAID

## 2018-08-30 VITALS
SYSTOLIC BLOOD PRESSURE: 128 MMHG | WEIGHT: 121 LBS | HEART RATE: 66 BPM | BODY MASS INDEX: 18.99 KG/M2 | HEIGHT: 67 IN | DIASTOLIC BLOOD PRESSURE: 86 MMHG

## 2018-08-30 DIAGNOSIS — F17.209 NICOTINE DEPENDENCE WITH NICOTINE-INDUCED DISORDER, UNSPECIFIED NICOTINE PRODUCT TYPE: ICD-10-CM

## 2018-08-30 DIAGNOSIS — M47.816 OSTEOARTHRITIS OF LUMBAR SPINE, UNSPECIFIED SPINAL OSTEOARTHRITIS COMPLICATION STATUS: ICD-10-CM

## 2018-08-30 DIAGNOSIS — M51.36 LUMBAR DEGENERATIVE DISC DISEASE: Primary | ICD-10-CM

## 2018-08-30 PROCEDURE — 99213 OFFICE O/P EST LOW 20 MIN: CPT | Performed by: INTERNAL MEDICINE

## 2018-08-30 PROCEDURE — 4004F PT TOBACCO SCREEN RCVD TLK: CPT | Performed by: INTERNAL MEDICINE

## 2018-08-30 PROCEDURE — G8427 DOCREV CUR MEDS BY ELIG CLIN: HCPCS | Performed by: INTERNAL MEDICINE

## 2018-08-30 PROCEDURE — G8420 CALC BMI NORM PARAMETERS: HCPCS | Performed by: INTERNAL MEDICINE

## 2018-08-30 RX ORDER — VARENICLINE TARTRATE 1 MG/1
1 TABLET, FILM COATED ORAL 2 TIMES DAILY
Qty: 60 TABLET | Refills: 1 | Status: SHIPPED | OUTPATIENT
Start: 2018-08-30 | End: 2018-11-25 | Stop reason: ALTCHOICE

## 2018-08-30 RX ORDER — TRAMADOL HYDROCHLORIDE 50 MG/1
100 TABLET ORAL 3 TIMES DAILY PRN
Qty: 180 TABLET | Refills: 0 | Status: SHIPPED | OUTPATIENT
Start: 2018-08-30 | End: 2018-09-29

## 2018-08-30 ASSESSMENT — ENCOUNTER SYMPTOMS
COUGH: 0
ABDOMINAL PAIN: 0
BACK PAIN: 1
BLURRED VISION: 0
SORE THROAT: 0
NAUSEA: 0
SHORTNESS OF BREATH: 0
HEARTBURN: 0

## 2018-08-30 NOTE — PROGRESS NOTES
gabapentin (NEURONTIN) 800 MG tablet Take 1 tablet by mouth 4 times daily for 30 days. . 8/14/18 9/13/18 Yes Sanam Mtz MD   etodolac (LODINE) 500 MG tablet Take 1 tablet by mouth 2 times daily 8/3/18  Yes Sanam Mtz MD   baclofen (LIORESAL) 10 MG tablet Take 1 tablet by mouth 3 times daily as needed (back spasm) 8/3/18 9/2/18 Yes Sanam Mtz MD   varenicline (CHANTIX STARTING MONTH PAK) 0.5 MG X 11 & 1 MG X 42 tablet Take by mouth. 8/3/18  Yes Sanam Mtz MD   EPINEPHrine (EPIPEN 2-ROBERTO) 0.3 MG/0.3ML SOAJ injection Inject 0.3 mLs into the muscle once as needed (For anaphylactic symptoms) 7/12/18 7/12/18  Sandie Luo MD        Allergies:       Bee venom and Flexeril [cyclobenzaprine]    Social History:     Tobacco:    reports that she has been smoking Cigarettes. She has a 10.50 pack-year smoking history. She has never used smokeless tobacco.  Alcohol:      reports that she does not drink alcohol. Drug Use:  reports that she does not use drugs. Family History:     No family history on file. Review of Systems:         Review of Systems   Constitutional: Negative for chills, fever, malaise/fatigue and weight loss. HENT: Negative for congestion and sore throat. Eyes: Negative for blurred vision. Respiratory: Negative for cough and shortness of breath. Cardiovascular: Negative for chest pain and palpitations. Gastrointestinal: Negative for abdominal pain, heartburn and nausea. Genitourinary: Negative for dysuria. Musculoskeletal: Positive for back pain (chronic). Skin: Negative for rash. Neurological: Negative for dizziness and headaches. Psychiatric/Behavioral: Negative for depression, hallucinations, substance abuse and suicidal ideas. The patient is not nervous/anxious and does not have insomnia.           Physical Exam:     Vitals:  /86 (Site: Left Arm, Position: Sitting, Cuff Size: Large Adult)   Pulse 66   Ht 5' 7\" (1.702 m)   Wt 121 lb (54.9 kg) BMI 18.95 kg/m²       Physical Exam   Constitutional: She is oriented to person, place, and time. She appears well-developed and well-nourished. No distress. HENT:   Head: Normocephalic and atraumatic. Neck: No thyromegaly present. Cardiovascular: Normal rate, regular rhythm and normal heart sounds. No murmur heard. Pulmonary/Chest: Effort normal and breath sounds normal. She has no wheezes. She has no rales. Abdominal: Soft. Bowel sounds are normal. She exhibits no distension and no mass. There is no tenderness. Musculoskeletal: Normal range of motion. She exhibits tenderness (lumbar spine tender to palpation). She exhibits no edema. Lymphadenopathy:     She has no cervical adenopathy. Neurological: She is alert and oriented to person, place, and time. Skin: Skin is warm and dry. No rash noted. Psychiatric: She has a normal mood and affect. Her behavior is normal. Judgment normal.   Vitals reviewed. Data:     No results found for: NA, K, CL, CO2, BUN, CREATININE, GLUCOSE, PROT, LABALBU, BILITOT, ALKPHOS, AST, ALT  No results found for: WBC, RBC, HGB, HCT, MCV, MCH, MCHC, RDW, PLT, MPV  Lab Results   Component Value Date    TSH 0.21 08/24/2018     Lab Results   Component Value Date    CHOL 169 08/24/2018    HDL 53 08/24/2018          Assessment & Plan        Diagnosis Orders   1. Lumbar degenerative disc disease   Will refill tramadol until she is able to see her pain doctor in [place] traMADol (ULTRAM) 50 MG tablet   2. Nicotine dependence with nicotine-induced disorder, unspecified nicotine product type   She would like to continue Chantix, ordered refill. varenicline (CHANTIX CONTINUING MONTH ROBERTO) 1 MG tablet   3.  Osteoarthritis of lumbar spine, unspecified spinal osteoarthritis complication status  traMADol (ULTRAM) 50 MG tablet                   Completed Refills   Requested Prescriptions     Signed Prescriptions Disp Refills    varenicline (CHANTIX CONTINUING MONTH ROBERTO) 1 MG tablet 60 tablet 1     Sig: Take 1 tablet by mouth 2 times daily    traMADol (ULTRAM) 50 MG tablet 180 tablet 0     Sig: Take 2 tablets by mouth 3 times daily as needed for Pain for up to 30 days. .     Return in about 6 months (around 2/28/2019) for fibromyalgia. Orders Placed This Encounter   Medications    varenicline (CHANTIX CONTINUING MONTH PAK) 1 MG tablet     Sig: Take 1 tablet by mouth 2 times daily     Dispense:  60 tablet     Refill:  1    traMADol (ULTRAM) 50 MG tablet     Sig: Take 2 tablets by mouth 3 times daily as needed for Pain for up to 30 days. .     Dispense:  180 tablet     Refill:  0     No orders of the defined types were placed in this encounter. Patient Instructions     SURVEY:    You may be receiving a survey from [employer] regarding your visit today. Please complete the survey to enable us to provide the highest quality of care to you and your family. If you cannot score us a very good on any question, please call the office to discuss how we could of made your experience a very good one. Thank you. Electronically signed by Bethanne Babinski, MD on 8/30/2018 at 2:29 PM           Completed Refills   Requested Prescriptions     Signed Prescriptions Disp Refills    varenicline (CHANTIX CONTINUING MONTH PAK) 1 MG tablet 60 tablet 1     Sig: Take 1 tablet by mouth 2 times daily    traMADol (ULTRAM) 50 MG tablet 180 tablet 0     Sig: Take 2 tablets by mouth 3 times daily as needed for Pain for up to 30 days. Petty Dutta

## 2018-10-08 ENCOUNTER — TELEPHONE (OUTPATIENT)
Dept: FAMILY MEDICINE CLINIC | Age: 43
End: 2018-10-08

## 2018-10-30 RX ORDER — BACLOFEN 10 MG/1
10 TABLET ORAL 3 TIMES DAILY
Qty: 90 TABLET | Refills: 3 | Status: SHIPPED | OUTPATIENT
Start: 2018-10-30 | End: 2019-06-20 | Stop reason: SDUPTHER

## 2018-11-05 RX ORDER — GABAPENTIN 800 MG/1
TABLET ORAL
Refills: 0 | COMMUNITY
Start: 2018-09-29 | End: 2018-11-24 | Stop reason: SDUPTHER

## 2018-11-20 ENCOUNTER — TELEPHONE (OUTPATIENT)
Dept: FAMILY MEDICINE CLINIC | Age: 43
End: 2018-11-20

## 2018-11-20 DIAGNOSIS — M47.816 OSTEOARTHRITIS OF LUMBAR SPINE, UNSPECIFIED SPINAL OSTEOARTHRITIS COMPLICATION STATUS: ICD-10-CM

## 2018-11-20 DIAGNOSIS — M51.36 LUMBAR DEGENERATIVE DISC DISEASE: ICD-10-CM

## 2018-11-20 RX ORDER — ETODOLAC 500 MG/1
500 TABLET, FILM COATED ORAL 2 TIMES DAILY
Qty: 60 TABLET | Refills: 3 | Status: SHIPPED | OUTPATIENT
Start: 2018-11-20

## 2018-11-25 ENCOUNTER — HOSPITAL ENCOUNTER (EMERGENCY)
Age: 43
Discharge: HOME | End: 2018-11-25
Attending: EMERGENCY MEDICINE
Payer: MEDICAID

## 2018-11-25 VITALS
HEIGHT: 67 IN | WEIGHT: 119 LBS | SYSTOLIC BLOOD PRESSURE: 130 MMHG | TEMPERATURE: 98 F | RESPIRATION RATE: 20 BRPM | BODY MASS INDEX: 18.68 KG/M2 | HEART RATE: 99 BPM | DIASTOLIC BLOOD PRESSURE: 92 MMHG | OXYGEN SATURATION: 100 %

## 2018-11-25 DIAGNOSIS — S39.012A BACK STRAIN, INITIAL ENCOUNTER: Primary | ICD-10-CM

## 2018-11-25 DIAGNOSIS — S16.1XXA STRAIN OF NECK MUSCLE, INITIAL ENCOUNTER: ICD-10-CM

## 2018-11-25 PROCEDURE — 6370000000 HC RX 637 (ALT 250 FOR IP): Performed by: EMERGENCY MEDICINE

## 2018-11-25 PROCEDURE — 99283 EMERGENCY DEPT VISIT LOW MDM: CPT

## 2018-11-25 RX ORDER — NAPROXEN 375 MG/1
375 TABLET ORAL 2 TIMES DAILY
Qty: 30 TABLET | Refills: 0 | Status: SHIPPED | OUTPATIENT
Start: 2018-11-25

## 2018-11-25 RX ORDER — IBUPROFEN 200 MG
600 TABLET ORAL ONCE
Status: COMPLETED | OUTPATIENT
Start: 2018-11-25 | End: 2018-11-25

## 2018-11-25 RX ORDER — AMOXICILLIN AND CLAVULANATE POTASSIUM 500; 125 MG/1; MG/1
1 TABLET, FILM COATED ORAL 3 TIMES DAILY
Qty: 30 TABLET | Refills: 0 | Status: SHIPPED | OUTPATIENT
Start: 2018-11-25 | End: 2018-11-25 | Stop reason: CLARIF

## 2018-11-25 RX ORDER — METAXALONE 800 MG/1
800 TABLET ORAL 3 TIMES DAILY
Qty: 30 TABLET | Refills: 0 | Status: SHIPPED | OUTPATIENT
Start: 2018-11-25 | End: 2018-12-05

## 2018-11-25 RX ADMIN — IBUPROFEN 600 MG: 200 TABLET, FILM COATED ORAL at 13:27

## 2018-11-25 ASSESSMENT — ENCOUNTER SYMPTOMS
COLOR CHANGE: 0
EYE PAIN: 0
BACK PAIN: 1
DIARRHEA: 0
SORE THROAT: 0
NAUSEA: 0
TROUBLE SWALLOWING: 0
ABDOMINAL PAIN: 0
EYE REDNESS: 0
COUGH: 0
SHORTNESS OF BREATH: 0
VOMITING: 0

## 2018-11-25 ASSESSMENT — PAIN DESCRIPTION - ORIENTATION: ORIENTATION: RIGHT;LEFT;POSTERIOR

## 2018-11-25 ASSESSMENT — PAIN SCALES - GENERAL
PAINLEVEL_OUTOF10: 7
PAINLEVEL_OUTOF10: 8
PAINLEVEL_OUTOF10: 7

## 2018-11-25 ASSESSMENT — PAIN DESCRIPTION - ONSET: ONSET: SUDDEN

## 2018-11-25 ASSESSMENT — PAIN DESCRIPTION - PAIN TYPE: TYPE: ACUTE PAIN

## 2018-11-25 ASSESSMENT — PAIN DESCRIPTION - DESCRIPTORS: DESCRIPTORS: ACHING

## 2018-11-25 ASSESSMENT — PAIN DESCRIPTION - FREQUENCY: FREQUENCY: CONTINUOUS

## 2018-11-25 ASSESSMENT — PAIN DESCRIPTION - PROGRESSION: CLINICAL_PROGRESSION: GRADUALLY WORSENING

## 2018-11-25 ASSESSMENT — PAIN DESCRIPTION - LOCATION: LOCATION: BACK

## 2018-11-25 NOTE — ED PROVIDER NOTES
S1 intact. DIAGNOSTIC RESULTS       EMERGENCY DEPARTMENT COURSE and DIFFERENTIAL DIAGNOSIS/MDM:   Vitals:    Vitals:    11/25/18 1318   BP: (!) 130/92   Pulse: 99   Resp: 20   TempSrc: Oral   SpO2: 100%   Weight: 119 lb (54 kg)   Height: 5' 7\" (1.702 m)                 REASSESSMENT      Here in the ED patient be started on anti-inflammatories and discharged to home. PROCEDURES:  Unless otherwise noted below, none     Procedures    FINAL IMPRESSION      1. Back strain, initial encounter    2. Strain of neck muscle, initial encounter          DISPOSITION/PLAN   DISPOSITION Decision To Discharge 11/25/2018 01:31:50 PM      PATIENT REFERRED TO:  No follow-up provider specified.     DISCHARGE MEDICATIONS:  New Prescriptions    AMOXICILLIN-CLAVULANATE (AUGMENTIN) 500-125 MG PER TABLET    Take 1 tablet by mouth 3 times daily for 10 days    NAPROXEN (NAPROSYN) 375 MG TABLET    Take 1 tablet by mouth 2 times daily          (Please note that portions ofthis note were completed with a voice recognition program.  Efforts were made to edit the dictations but occasionally words are mis-transcribed.)    Hyacinth Burns MD(electronically signed)  Attending Emergency Physician            Hyacinth Burns MD  11/25/18 [id number]

## 2018-11-25 NOTE — LETTER
[facility]  [address] [address]  Phone: [phone]               November 25, 2018    Patient: Suni Amin   YOB: 1975   Date of Visit: 11/25/2018       To Whom It May Concern:    Manish Batista was seen and treated in our emergency department on 11/25/2018. She may return to work on 11/27/2018.       Sincerely,       Margie Ellis RN         Signature:__________________________________

## 2018-11-26 RX ORDER — GABAPENTIN 800 MG/1
TABLET ORAL
Qty: 120 TABLET | Refills: 1 | Status: SHIPPED | OUTPATIENT
Start: 2018-11-26 | End: 2019-01-18 | Stop reason: SDUPTHER

## 2018-11-27 ENCOUNTER — TELEPHONE (OUTPATIENT)
Dept: FAMILY MEDICINE CLINIC | Age: 43
End: 2018-11-27

## 2018-11-27 NOTE — TELEPHONE ENCOUNTER
[facility] Trans[facility] Initial Follow Up Call    Outreach made within 2 business days of discharge: Yes    Patient: Sveta Borges Patient : 1975   MRN: [id number]  Reason for Admission: There are no discharge diagnoses documented for the most recent discharge. Discharge Date: 18       Spoke with: Patient    Discharge department/facility: [facility] Patient Contact:  Was patient able to fill all prescriptions: No: not needed pt already takes  Was patient instructed to bring all medications to the follow-up visit: Yes  Is patient taking all medications as directed in the discharge summary? Yes  Does patient understand their discharge instructions: Yes  Does patient have questions or concerns that need addressed prior to 7-14 day follow up office visit: no    Scheduled appointment with PCP within 7-14 days    Follow Up  Patient unable to come in for appt due to work schedule. Patient does not feel she needs follow upas she is feeling better.     Autumn Johnson

## 2019-01-18 RX ORDER — GABAPENTIN 800 MG/1
TABLET ORAL
Qty: 120 TABLET | Refills: 2 | Status: SHIPPED | OUTPATIENT
Start: 2019-01-18 | End: 2019-04-22 | Stop reason: SDUPTHER

## 2019-04-22 RX ORDER — GABAPENTIN 800 MG/1
TABLET ORAL
Qty: 120 TABLET | Refills: 0 | Status: SHIPPED | OUTPATIENT
Start: 2019-04-22 | End: 2019-05-24 | Stop reason: SDUPTHER

## 2019-04-22 NOTE — TELEPHONE ENCOUNTER
Pt being seen tomorrow is out of Gabapentin, asking if you will refill?     [facility] Maintenance   Topic Date Due    HIV screen  03/20/1992    DTaP/Tdap/Td vaccine (1 - Tdap) 03/20/1996    Cervical cancer screen  03/20/1998    TSH testing  08/24/2019    Flu vaccine (Season Ended) 09/01/2019    Lipid screen  08/24/2023    Pneumococcal 0-64 years Vaccine  Completed             (applicable per patient's age: Cancer Screenings, Depression Screening, Fall Risk Screening, Immunizations)    LDL Cholesterol (mg/dL)   Date Value   08/24/2018 101      (goal A1C is < 7)   (goal LDL is <100) need 30-50% reduction from baseline     BP Readings from Last 3 Encounters:   11/25/18 (!) 130/92   08/30/18 128/86   08/03/18 110/72    (goal /80)      All Future Testing planned in [facility]:      Next Visit Date:  Future Appointments   Date Time Provider Pacheco Yeung   4/23/2019  1:00 PM Gabriella Hutchison, Marifer Treadwell            Patient Active Problem List:     Hyperthyroidism     Lumbar degenerative disc disease     OA (osteoarthritis)     Smoker     Fibromyalgia     Chronic low back pain

## 2019-05-24 ENCOUNTER — TELEPHONE (OUTPATIENT)
Dept: FAMILY MEDICINE CLINIC | Age: 44
End: 2019-05-24

## 2019-05-24 DIAGNOSIS — M51.36 LUMBAR DEGENERATIVE DISC DISEASE: Primary | ICD-10-CM

## 2019-05-24 RX ORDER — GABAPENTIN 800 MG/1
TABLET ORAL
Qty: 56 TABLET | Refills: 0 | Status: SHIPPED | OUTPATIENT
Start: 2019-05-24 | End: 2019-06-24

## 2019-05-24 NOTE — TELEPHONE ENCOUNTER
Ankit Conde needs a refill (7 day) on her Gabapentin. [facility] in [address] [facility] has appt on Tuesday 5/28/19.     Health Maintenance   Topic Date Due    HIV screen  03/20/1992    DTaP/Tdap/Td vaccine (1 - Tdap) 03/20/1996    Cervical cancer screen  03/20/1998    TSH testing  08/24/2019    Flu vaccine (Season Ended) 09/01/2019    Lipid screen  08/24/2023    Pneumococcal 0-64 years Vaccine  Completed             (applicable per patient's age: Cancer Screenings, Depression Screening, Fall Risk Screening, Immunizations)    LDL Cholesterol (mg/dL)   Date Value   08/24/2018 101      (goal A1C is < 7)   (goal LDL is <100) need 30-50% reduction from baseline     BP Readings from Last 3 Encounters:   11/25/18 (!) 130/92   08/30/18 128/86   08/03/18 110/72    (goal /80)      All Future Testing planned in [facility]:      Next Visit Date:  Future Appointments   Date Time Provider Pacheco Yeung   5/28/2019  3:30 PM Marifer Solomon            Patient Active Problem List:     Hyperthyroidism     Lumbar degenerative disc disease     OA (osteoarthritis)     Smoker     Fibromyalgia     Chronic low back pain

## 2019-06-21 RX ORDER — BACLOFEN 10 MG/1
TABLET ORAL
Qty: 90 TABLET | Refills: 3 | Status: SHIPPED | OUTPATIENT
Start: 2019-06-21

## 2019-06-21 NOTE — TELEPHONE ENCOUNTER
Last OV: 8/30/2018  Last RX: 10/30/18   Next scheduled apt: Visit date not found    Medication pending        Health Maintenance   Topic Date Due    HIV screen  03/20/1992    DTaP/Tdap/Td vaccine (1 - Tdap) 03/20/1996    Cervical cancer screen  03/20/1998    TSH testing  08/24/2019    Flu vaccine (Season Ended) 09/01/2019    Lipid screen  08/24/2023    Pneumococcal 0-64 years Vaccine  Completed             (applicable per patient's age: Cancer Screenings, Depression Screening, Fall Risk Screening, Immunizations)    LDL Cholesterol (mg/dL)   Date Value   08/24/2018 101      (goal A1C is < 7)   (goal LDL is <100) need 30-50% reduction from baseline     BP Readings from Last 3 Encounters:   11/25/18 (!) 130/92   08/30/18 128/86   08/03/18 110/72    (goal /80)      All Future Testing planned in [facility]:      Next Visit Date:  No future appointments.          Patient Active Problem List:     Hyperthyroidism     Lumbar degenerative disc disease     OA (osteoarthritis)     Smoker     Fibromyalgia     Chronic low back pain